# Patient Record
Sex: FEMALE | Race: WHITE | NOT HISPANIC OR LATINO | Employment: FULL TIME | URBAN - METROPOLITAN AREA
[De-identification: names, ages, dates, MRNs, and addresses within clinical notes are randomized per-mention and may not be internally consistent; named-entity substitution may affect disease eponyms.]

---

## 2017-01-17 ENCOUNTER — NON-PROVIDER VISIT (OUTPATIENT)
Dept: URGENT CARE | Facility: PHYSICIAN GROUP | Age: 32
End: 2017-01-17

## 2017-01-17 DIAGNOSIS — Z11.1 PPD SCREENING TEST: ICD-10-CM

## 2017-01-17 PROCEDURE — 86580 TB INTRADERMAL TEST: CPT | Performed by: PHYSICIAN ASSISTANT

## 2017-01-17 NOTE — PATIENT INSTRUCTIONS
Renay Rodney is a 31 y.o. female here for a non-provider visit for PPD placement -- Step 1 of 1    Reason for PPD:  work requirement    TB evaluation questionnaire completed? Yes   If any answers marked yes did you contact a provider prior to placing? Not Indicated  Pt notified to return to clinic for reading on: 1/19 1/20  Tuberculosis evaluation questionnaire documentation completed? Yes  LocationTuberculosis evaluation questionnaire filed: [facility]

## 2017-01-19 ENCOUNTER — NON-PROVIDER VISIT (OUTPATIENT)
Dept: URGENT CARE | Facility: PHYSICIAN GROUP | Age: 32
End: 2017-01-19

## 2017-01-19 LAB — TB WHEAL 3D P 5 TU DIAM: NORMAL MM

## 2017-02-03 ENCOUNTER — HOSPITAL ENCOUNTER (OUTPATIENT)
Dept: RADIOLOGY | Facility: MEDICAL CENTER | Age: 32
End: 2017-02-03
Attending: FAMILY MEDICINE
Payer: COMMERCIAL

## 2017-02-03 ENCOUNTER — OFFICE VISIT (OUTPATIENT)
Dept: URGENT CARE | Facility: PHYSICIAN GROUP | Age: 32
End: 2017-02-03
Payer: COMMERCIAL

## 2017-02-03 VITALS
RESPIRATION RATE: 12 BRPM | BODY MASS INDEX: 36.41 KG/M2 | TEMPERATURE: 97.2 F | OXYGEN SATURATION: 96 % | HEIGHT: 67 IN | DIASTOLIC BLOOD PRESSURE: 70 MMHG | WEIGHT: 232 LBS | SYSTOLIC BLOOD PRESSURE: 114 MMHG | HEART RATE: 77 BPM

## 2017-02-03 DIAGNOSIS — M25.531 RIGHT WRIST PAIN: ICD-10-CM

## 2017-02-03 DIAGNOSIS — G56.21 ULNAR NEUROPATHY OF RIGHT UPPER EXTREMITY: ICD-10-CM

## 2017-02-03 PROCEDURE — 73110 X-RAY EXAM OF WRIST: CPT | Mod: RT

## 2017-02-03 PROCEDURE — 99214 OFFICE O/P EST MOD 30 MIN: CPT | Performed by: FAMILY MEDICINE

## 2017-02-03 ASSESSMENT — ENCOUNTER SYMPTOMS
FOCAL WEAKNESS: 0
FEVER: 0

## 2017-02-03 ASSESSMENT — PAIN SCALES - GENERAL: PAINLEVEL: 3=SLIGHT PAIN

## 2017-02-03 NOTE — MR AVS SNAPSHOT
"        Renay Madyson Louisecock   2/3/2017 10:30 AM   Office Visit   MRN: [id number]    Department:  [facility]   Dept Phone:  [phone]    Description:  Female : 1985   Provider:  Fernando Santizo M.D.           Reason for Visit     Wrist Pain x 2 weeks / RT Wrist / Dull ache      Allergies as of 2/3/2017     Allergen Noted Reactions    Nkda [No Known Drug Allergy] 2009         You were diagnosed with     Right wrist pain   [611997]       Ulnar neuropathy of right upper extremity   [920808]         Vital Signs     Blood Pressure Pulse Temperature Respirations Height Weight    114/70 mmHg 77 36.2 °C (97.2 °F) 12 1.702 m (5' 7.01\") 105.235 kg (232 lb)    Body Mass Index Oxygen Saturation Smoking Status             36.33 kg/m2 96% Current Every Day Smoker         Basic Information     Date Of Birth Sex Race Ethnicity Preferred Language    1985 Female White Non- English      Problem List              ICD-10-CM Priority Class Noted - Resolved    Infertility [id number]   2014 - Present    PCOS (polycystic ovarian syndrome) E28.2   Unknown - Present    Obesity E66.9   Unknown - Present    Preventative health care Z00.00   3/18/2016 - Present    Dyslipidemia E78.5   3/18/2016 - Present    Tobacco abuse Z72.0   3/18/2016 - Present      Health Maintenance        Date Due Completion Dates    IMM PNEUMOCOCCAL 19-64 (ADULT) MEDIUM RISK SERIES (1 of 1 - PPSV23) 2002 ---    PAP SMEAR 2004 ---    IMM INFLUENZA (1) 2016 10/30/2015, 2014    IMM DTaP/Tdap/Td Vaccine (2 - Td) 2020            Current Immunizations     Influenza Vaccine Quad Inj (Pf) 10/30/2015    Influenza Vaccine Quad Inj (Preserved) 2014    Tdap Vaccine 2010    Tuberculin Skin Test 2017, 3/1/2010  4:30 PM      Below and/or attached are the medications your provider expects you to take. Review all of your home medications and newly ordered medications with your provider and/or pharmacist. " Follow medication instructions as directed by your provider and/or pharmacist. Please keep your medication list with you and share with your provider. Update the information when medications are discontinued, doses are changed, or new medications (including over-the-counter products) are added; and carry medication information at all times in the event of emergency situations     Allergies:  NKDA - (reactions not documented)               Medications  Valid as of: February 03, 2017 - 12:34 PM    Generic Name Brand Name Tablet Size Instructions for use    Ibuprofen (Tab) MOTRIN 200 MG Take 200 mg by mouth every 6 hours as needed.        Inulin   Take  by mouth.        MetFORMIN HCl (Tab) GLUCOPHAGE 500 MG Take 1,000 mg by mouth 2 times a day.        Multiple Vitamins-Minerals (Tab) MULTI FOR HER  Take  by mouth.        Sulfamethoxazole-Trimethoprim (Tab) BACTRIM -160 MG Take 1 Tab by mouth 2 times a day.        .                 Medicines prescribed today were sent to:     [email or web address] [address] [place] [address], [address] AT [address] [facility] [address]    Phone: [phone] Fax: [phone]    Open 24 Hours?: No      Medication refill instructions:       If your prescription bottle indicates you have medication refills left, it is not necessary to call your provider’s office. Please contact your pharmacy and they will refill your medication.    If your prescription bottle indicates you do not have any refills left, you may request refills at any time through one of the following ways: The online [employer] system (except Urgent Care), by calling your provider’s office, or by asking your pharmacy to contact your provider’s office with a refill request. Medication refills are processed only during regular business hours and may not be available until the next business day. Your provider may request additional information or to have a follow-up visit with you  prior to refilling your medication.   *Please Note: Medication refills are assigned a new Rx number when refilled electronically. Your pharmacy may indicate that no refills were authorized even though a new prescription for the same medication is available at the pharmacy. Please request the medicine by name with the pharmacy before contacting your provider for a refill.        Your To Do List     Future Labs/Procedures Complete By Expires    DX-WRIST-COMPLETE 3+ RIGHT  As directed 2/3/2018         [employer] Access Code: Activation code not generated  Current "[employer]" Status: Active

## 2017-02-03 NOTE — PROGRESS NOTES
"Subjective:      Renay Rodney is a 31 y.o. female who presents with Wrist Pain            Wrist Pain     2 weeks right wrist pain, ulnar aspect. Appeared swollen initially. Now intermittent/worse at night. Severity 3/10 currently. Intermittent radiation to FA. Paresthesia and some numbness to ulnar aspect and 4-5 fingers. No weakness. No trauma or clear trigger. No prior injury. Right hand dominant. No PMH RA. OTC ibuprofen with some relief.     Review of Systems   Constitutional: Negative for fever.   Musculoskeletal:        No elbow pain     Skin: Negative for itching and rash.   Neurological: Negative for focal weakness.        Paresthesia ulnar aspect hand     .  Medications, Allergies, and current problem list reviewed today in Epic       Objective:     /70 mmHg  Pulse 77  Temp(Src) 36.2 °C (97.2 °F)  Resp 12  Ht 1.702 m (5' 7.01\")  Wt 105.235 kg (232 lb)  BMI 36.33 kg/m2  SpO2 96%     Physical Exam   Constitutional: She appears well-developed and well-nourished. No distress.   HENT:   Head: Normocephalic and atraumatic.   Right Ear: External ear normal.   Left Ear: External ear normal.   Musculoskeletal:   Right wrist: tender ulnar aspect without deformity. FROM. Full strength. Reproduced with passive radial deviation. Sensory to light and sharp touch decreased in ulnar nerve distribution. No weakness. Negative tinel's elbow     Neurological:   Speech is clear. Patient is appropriate and cooperative.     Skin: Skin is warm and dry. No rash noted.               Assessment/Plan:     Xray: no fracture or dislocation by my read. Radiology review pending.    1. Right wrist pain  DX-WRIST-COMPLETE 3+ RIGHT   2. Ulnar neuropathy of right upper extremity         Differential diagnosis, natural history, supportive care, and indications for immediate follow-up discussed at length.   Splint placed. Discussed NCS vs watchful waiting and conservative treatment with nsaid. Pt. Elects conservative for " now and will f/u with worse or persistent sx's.